# Patient Record
Sex: MALE | Race: WHITE | HISPANIC OR LATINO | URBAN - METROPOLITAN AREA
[De-identification: names, ages, dates, MRNs, and addresses within clinical notes are randomized per-mention and may not be internally consistent; named-entity substitution may affect disease eponyms.]

---

## 2023-06-25 ENCOUNTER — EMERGENCY (EMERGENCY)
Facility: HOSPITAL | Age: 28
LOS: 1 days | Discharge: HOME | End: 2023-06-25
Attending: EMERGENCY MEDICINE | Admitting: EMERGENCY MEDICINE
Payer: OTHER MISCELLANEOUS

## 2023-06-25 VITALS
OXYGEN SATURATION: 94 % | DIASTOLIC BLOOD PRESSURE: 83 MMHG | HEART RATE: 68 BPM | SYSTOLIC BLOOD PRESSURE: 117 MMHG | TEMPERATURE: 98 F | RESPIRATION RATE: 16 BRPM

## 2023-06-25 PROCEDURE — 12001 RPR S/N/AX/GEN/TRNK 2.5CM/<: CPT

## 2023-06-25 PROCEDURE — 99284 EMERGENCY DEPT VISIT MOD MDM: CPT | Mod: 25

## 2023-06-25 PROCEDURE — 99053 MED SERV 10PM-8AM 24 HR FAC: CPT

## 2023-06-25 RX ORDER — LIDOCAINE HYDROCHLORIDE AND EPINEPHRINE 10; 10 MG/ML; UG/ML
1 INJECTION, SOLUTION INFILTRATION; PERINEURAL ONCE
Refills: 0 | Status: COMPLETED | OUTPATIENT
Start: 2023-06-25 | End: 2023-06-25

## 2023-06-25 RX ORDER — TETANUS TOXOID, REDUCED DIPHTHERIA TOXOID AND ACELLULAR PERTUSSIS VACCINE, ADSORBED 5; 2.5; 8; 8; 2.5 [IU]/.5ML; [IU]/.5ML; UG/.5ML; UG/.5ML; UG/.5ML
0.5 SUSPENSION INTRAMUSCULAR ONCE
Refills: 0 | Status: COMPLETED | OUTPATIENT
Start: 2023-06-25 | End: 2023-06-25

## 2023-06-25 NOTE — ED PROVIDER NOTE - PATIENT PORTAL LINK FT
You can access the FollowMyHealth Patient Portal offered by [facility] by registering at the following website: [email or web address]. By joining [facility]’s FollowMyHealth portal, you will also be able to view your health information using other applications (apps) compatible with our system.

## 2023-06-25 NOTE — ED PROVIDER NOTE - OBJECTIVE STATEMENT
28 year old male came for avulsion of the left index finger X 2 hours. Patient reported that he was cooking when a  accidentally cut his nail and tip. Tetanus status is unknown. Right hand dominant. Denies any numbness, weakness, or difficulty moving finger.

## 2023-06-25 NOTE — ED PROVIDER NOTE - NSFOLLOWUPINSTRUCTIONS_ED_ALL_ED_FT
Cuidados de mayra herida tratada con adhesivo para tejidos  Tissue Adhesive Wound Care  Algunos valladares, heridas, laceraciones e incisiones pueden repararse con un adhesivo para tejidos, también denominado goma para cerrar la piel. Trudy adhesivo mantiene unida la piel para que pueda cicatrizar más rápidamente. En aproximadamente 1 minuto, adhiere con firmeza la piel, y alcanza haley fuerza máxima en, aproximadamente, 2 o 3 minutos. El adhesivo desaparece solo mientras la herida cicatriza. Es importante cuidar rebel haley herida mientras cicatriza.    Siga estas instrucciones en haley casa:  Cuidados de la herida    Washing hands with soap and water.  Two wounds closed with skin glue. One is normal. The other is red with pus and infected.  Si le zapata colocado mayra venda (vendaje), manténgala limpia y seca.  Siga las instrucciones del médico respecto a la frecuencia con la que debe cambiar el vendaje. Asegúrese de hacer lo siguiente:  Lávese las monse con agua y jabón farhana al menos 20 segundos antes y después de cambiar el vendaje. Use desinfectante para monse si no dispone de agua y jabón.  Cambie el vendaje adina se lo haya indicado el médico.  Deje el adhesivo para tejidos colocado. Se saldrá por sí solo después de 7 a 10 días.  No rasque, no frote ni raspe el adhesivo.  No coloque cinta sobre el adhesivo. El adhesivo podría despegarse de la herida al quitar la cinta.  Revise la herida todos los días para asegurarse de que no esté comenzando a abrirse nuevamente.  Proteja la herida de nuevas lesiones hasta que se cure.  Controle la cortney de la herida todos los días para detectar signos de infección. Esté atento a los siguientes signos:  Aumento del enrojecimiento, la hinchazón o el dolor.  Líquido o dafne.  Calor o erupción cutánea alrededor de la herida.  Pus o mal olor.  Mayra dureza o un bulto que no proviene del adhesivo.  Larry    No tome larry de inmersión, no nade ni use el jacuzzi hasta que el médico lo autorice. Pregúntele al médico si puede ducharse. Tobi vez solo le permitan darse larry de esponja.  Por lo general, puede ducharse después de las primeras 24 horas.  Cubra el vendaje con un envoltorio impermeable cuando se duche.  No debe empapar la cortney donde se colocó el adhesivo para tejidos.  No use jabones, productos con vaselina ni ungüentos en la herida. Algunos ungüentos pueden debilitar el adhesivo.  Comida y bebida    Consuma alimentos saludables para ayudar a que la herida cicatrice. Novinger se lo haya indicado el médico, coma alimentos con alto contenido de:  Proteína. Estos alimentos incluyen carne, pescado, huevos, productos lácteos, frijoles y nueces.  Vitamina A. Estos alimentos incluyen zanahorias y verduras de hoja de color tamia oscuro.  Vitamina C. Estos alimentos incluyen cítricos, tomates, brócoli y pimientos.  Madeline suficiente líquido adina para mantener la orina de color amarillo pálido.  Instrucciones generales    Proteja la herida del sol farhana los primeros 6 meses o farhana el tiempo que le haya indicado el médico. Aplíquese pantalla solar con un factor de protección solar (FPS) de 30 o más alto alrededor de la cicatriz, o cúbrala.  Use los medicamentos de venta brendan y los recetados solamente adina se lo haya indicado el médico.  No consuma ningún producto que contenga nicotina o tabaco. Estos productos incluyen cigarrillos, tabaco para mascar y aparatos de vapeo, adina los cigarrillos electrónicos. Estos pueden retrasar la cicatrización de la herida. Si necesita ayuda para dejar de fumar, consulte al médico.  Concurra a todas las visitas de seguimiento. Eclectic es importante.  Comuníquese con un médico si:  El adhesivo para tejido se empapa con danfe o se  antes de que la herida haya cicatrizado. Es posible que haya que cambiar el adhesivo.  Tiene fiebre o escalofríos.  Tiene enrojecimiento, hinchazón o dolor alrededor de la herida.  Observa líquido o dafne que sale de la herida.  Aparece mayra erupción mayra vez aplicado el adhesivo.  Tiene mayra dureza alrededor del lugar de la herida.  Solicite ayuda de inmediato si:  La herida vuelve a abrirse.  Tiene mayra línea nic en la cortney alrededor de la herida.  Tiene pus o percibe mal olor que emana de la herida.  Resumen  El adhesivo desaparece solo mientras la herida cicatriza. Es importante cuidar rebel haley herida en el hogar mientras cicatriza.  Siempre lávese las monse farhana al menos 20 segundos con agua y jabón antes y después de cambiar la venda (vendaje).  Para ayudar con la cicatrización, coma alimentos con alto contenido de proteínas, vitamina A y vitamina C.  Controle la cortney de la herida todos los días para detectar signos de infección.  Esta información no tiene adina fin reemplazar el consejo del médico. Asegúrese de hacerle al médico cualquier pregunta que tenga.

## 2023-06-25 NOTE — ED PROVIDER NOTE - CLINICAL SUMMARY MEDICAL DECISION MAKING FREE TEXT BOX
Patient had lidocaine with epi injected to point of bleeding with decreased bleeding. Dermabond was used with hemostasis. Observed in ED with no events. Adacel was given. Educated on wound care instructions.

## 2023-06-26 RX ADMIN — TETANUS TOXOID, REDUCED DIPHTHERIA TOXOID AND ACELLULAR PERTUSSIS VACCINE, ADSORBED 0.5 MILLILITER(S): 5; 2.5; 8; 8; 2.5 SUSPENSION INTRAMUSCULAR at 00:19

## 2023-06-26 RX ADMIN — LIDOCAINE HYDROCHLORIDE AND EPINEPHRINE 1 MILLILITER(S): 10; 10 INJECTION, SOLUTION INFILTRATION; PERINEURAL at 00:19

## 2023-06-26 NOTE — ED ADULT NURSE NOTE - NSFALLRISKINTERV_ED_ALL_ED

## 2023-06-28 DIAGNOSIS — S61.311A LACERATION WITHOUT FOREIGN BODY OF LEFT INDEX FINGER WITH DAMAGE TO NAIL, INITIAL ENCOUNTER: ICD-10-CM
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
## 2023-06-28 DIAGNOSIS — Z23 ENCOUNTER FOR IMMUNIZATION: ICD-10-CM

## 2024-02-21 NOTE — ED PROVIDER NOTE - LOCATION
Refill request received from patient      Next Office Visit Date:  Future Appointments   Date Time Provider Department Center   5/20/2024  1:00 PM Juanita Atkinson APRN [facility] - Please review via PDMP        Last Office Visit:    2/12/2024   
finger